# Patient Record
Sex: FEMALE | Race: WHITE | Employment: FULL TIME | URBAN - METROPOLITAN AREA
[De-identification: names, ages, dates, MRNs, and addresses within clinical notes are randomized per-mention and may not be internally consistent; named-entity substitution may affect disease eponyms.]

---

## 2017-11-01 ENCOUNTER — OFFICE VISIT (OUTPATIENT)
Dept: FAMILY MEDICINE CLINIC | Age: 37
End: 2017-11-01
Payer: COMMERCIAL

## 2017-11-01 VITALS
HEIGHT: 65 IN | SYSTOLIC BLOOD PRESSURE: 109 MMHG | DIASTOLIC BLOOD PRESSURE: 75 MMHG | RESPIRATION RATE: 14 BRPM | OXYGEN SATURATION: 100 % | TEMPERATURE: 96.5 F | BODY MASS INDEX: 24.99 KG/M2 | WEIGHT: 150 LBS | HEART RATE: 92 BPM

## 2017-11-01 DIAGNOSIS — Z87.19 HX OF GALLSTONES: ICD-10-CM

## 2017-11-01 DIAGNOSIS — E55.9 VITAMIN D DEFICIENCY: ICD-10-CM

## 2017-11-01 DIAGNOSIS — R10.13 EPIGASTRIC PAIN: ICD-10-CM

## 2017-11-01 DIAGNOSIS — R10.11 RUQ PAIN: Primary | ICD-10-CM

## 2017-11-01 DIAGNOSIS — R11.0 NAUSEA: ICD-10-CM

## 2017-11-01 PROCEDURE — 99214 OFFICE O/P EST MOD 30 MIN: CPT | Performed by: NURSE PRACTITIONER

## 2017-11-01 ASSESSMENT — ENCOUNTER SYMPTOMS
EYE DISCHARGE: 0
SHORTNESS OF BREATH: 0
NAUSEA: 1
EYE REDNESS: 0
DIARRHEA: 0
BLOOD IN STOOL: 0
COUGH: 0
SORE THROAT: 0
ABDOMINAL PAIN: 1
ABDOMINAL DISTENTION: 0
COLOR CHANGE: 0
RHINORRHEA: 0
CONSTIPATION: 1
ANAL BLEEDING: 0

## 2017-11-01 ASSESSMENT — PATIENT HEALTH QUESTIONNAIRE - PHQ9
SUM OF ALL RESPONSES TO PHQ9 QUESTIONS 1 & 2: 0
1. LITTLE INTEREST OR PLEASURE IN DOING THINGS: 0
2. FEELING DOWN, DEPRESSED OR HOPELESS: 0
SUM OF ALL RESPONSES TO PHQ QUESTIONS 1-9: 0

## 2017-11-01 NOTE — PATIENT INSTRUCTIONS
Care Instructions  Your Care Instructions  Sometimes it can be hard to pinpoint the cause of indigestion (dyspepsia or heartburn). Most cases of an upset stomach with bloating, burning, burping, and nausea are minor and go away within several hours. Home treatment and over-the-counter medicine often are able to control symptoms. But if you take medicine to relieve your indigestion without making diet and lifestyle changes, your symptoms are likely to return again and again. If you get indigestion often, it may be a sign of a more serious medical problem. Be sure to follow up with your doctor, who may want to do tests to be sure of the cause of your indigestion. Follow-up care is a key part of your treatment and safety. Be sure to make and go to all appointments, and call your doctor if you are having problems. It's also a good idea to know your test results and keep a list of the medicines you take. How can you care for yourself at home? · Your doctor may recommend over-the-counter medicine. For mild or occasional indigestion, antacids such as Tums, Gaviscon, Mylanta, or Maalox may help. Be careful when you take over-the-counter antacid medicines. Many of these medicines have aspirin in them. Read the label to make sure that you are not taking more than the recommended dose. Too much aspirin can be harmful. · Your doctor also may recommend over-the-counter acid reducers, such as Pepcid AC, Tagamet HB, Zantac 75, or Prilosec. Read and follow all instructions on the label. If you use these medicines often, talk with your doctor. · Change your eating habits. ¨ It's best to eat several small meals instead of two or three large meals. ¨ After you eat, wait 2 to 3 hours before you lie down. ¨ Chocolate, mint, and alcohol can make GERD worse. ¨ Spicy foods, foods that have a lot of acid (like tomatoes and oranges), and coffee can make GERD symptoms worse in some people.  If your symptoms are worse after you eat a Search Health Information box to learn more about \"Indigestion (Dyspepsia or Heartburn): Care Instructions. \"     If you do not have an account, please click on the \"Sign Up Now\" link. Current as of: November 16, 2016  Content Version: 11.3  © 2330-9703 [email or web address]. Care instructions adapted under license by [facility] ([facility]). If you have questions about a medical condition or this instruction, always ask your healthcare professional. [address] any warranty or liability for your use of this information. Patient Education        Learning About Vitamin D  Why is it important to get enough vitamin D? Your body needs vitamin D to absorb calcium. Calcium keeps your bones and muscles, including your heart, healthy and strong. If your muscles don't get enough calcium, they can cramp, hurt, or feel weak. You may have long-term (chronic) muscle aches and pains. If you don't get enough vitamin D throughout life, you have an increased chance of having thin and brittle bones (osteoporosis) in your later years. Children who don't get enough vitamin D may not grow as much as others their age. They also have a chance of getting a rare disease called rickets. It causes weak bones. Vitamin D and calcium are added to many foods. And your body uses sunshine to make its own vitamin D. How much vitamin D do you need? The [employer] of [employer] recommends that people ages 3 through 79 get 600 IU (international units) every day. Adults 71 and older need 800 IU every day. Blood tests for vitamin D can check your vitamin D level. But there is no standard normal range used by all laboratories. The [employer] of [employer] recommends a blood level of 20 ng/mL of vitamin D for healthy bones. And most people in the [place] and [facility] ([facility]) meet this goal.  How can you get more vitamin D? Foods that contain vitamin D include:  · [place], tuna, and mackerel.  These are some of the best foods to eat when you need to get more vitamin D.  · Cheese, egg yolks, and beef liver. These foods have vitamin D in small amounts. · Milk, soy drinks, orange juice, yogurt, margarine, and some kinds of cereal have vitamin D added to them. Some people don't make vitamin D as well as others. They may have to take extra care in getting enough vitamin D. Things that reduce how much vitamin D your body makes include:  · Dark skin, such as many  Americans have. · Age, especially if you are older than 72. · Digestive problems, such as Crohn's or celiac disease. · Liver and kidney disease. Some people who do not get enough vitamin D may need supplements. Are there any risks from taking vitamin D?  · Too much vitamin D:  ¨ Can damage your kidneys. ¨ Can cause nausea and vomiting, constipation, and weakness. ¨ Raises the amount of calcium in your blood. If this happens, you can get confused or have an irregular heart rhythm. · Vitamin D may interact with other medicines. Tell your doctor about all of the medicines you take, including over-the-counter drugs, herbs, and pills. Tell your doctor about all of your current medical problems. Where can you learn more? Go to https://[employer].Shopeando. org and sign in to your [employer] account. Enter [id number] in the Provade box to learn more about \"Learning About Vitamin D. \"     If you do not have an account, please click on the \"Sign Up Now\" link. Current as of: July 26, 2016  Content Version: 11.3  © 6383-0002 [employer]. Care instructions adapted under license by [facility] ([facility]). If you have questions about a medical condition or this instruction, always ask your healthcare professional. [place] [address] any warranty or liability for your use of this information.

## 2017-11-01 NOTE — PROGRESS NOTES
[address]  [facility]  [facility]  [address] [place] [address] [address] [address]  Dept: [phone]  Dept Fax: [phone]: [phone]    Visit Date: 11/1/2017    Shantell Baltazar is a 29 y.o. female who presents today for:  Chief Complaint   Patient presents with    GI Problem    Nausea     x 3 weeks    Emesis     consisantly for 3 weeks    Other     stomach pain     HPI:     Chrystal Cisneros is a 29year old female who presents today for stomach issues. She started about 3-4 weeks ago. It started as reflux for 4-5 days, she did not take anything for that. She has one cup of coffee per day and 2 glasses of wine per week. She does drink a lot of teas. She then ended up vomiting. Then her family started to have GI issues also, they have since gotten better and she has not gotten better. The second week she has a lot of burning in the epigastric area. Last week she started [employer] [facility], it would help some but when it wore off her symptoms would return. She switched to a [place]. Over the weekend the pain has shifted to the right upper quadrant. When she was 17 she had stones in her gallbladder. They wanted to take it out at 13 but her mother treated her \"naturally\". She now has then constant nausea and a dull pain in the RUQ. Typically she has one BM daily. She is not having BM every other day. She has not noticed any change in the color of her stools. She has had a couple of loose stools a few weeks ago but not since then. She does have seasonal allergies. She has noticed that her voice is lower than normal.   LMP was 2 weeks ago.      HPI  Health Maintenance   Topic Date Due    DTaP/Tdap/Td vaccine (1 - Tdap) 12/29/2000    Cervical cancer screen  01/01/2016    Flu vaccine (1) 09/01/2017    HIV screen  Completed       Past Medical History:   Diagnosis Date    Bell's palsy 2003,2009    Left Sided    Preeclampsia 2009    Varicosities 2013    vuvlvar varicosity-resolved     decreased breath sounds. She has no wheezes. She has no rhonchi. She has no rales. Abdominal: Soft. Bowel sounds are normal. She exhibits no distension. There is no hepatosplenomegaly. There is tenderness in the right upper quadrant and epigastric area. There is positive Prajapati's sign. There is no CVA tenderness. Musculoskeletal: She exhibits no edema. Lymphadenopathy:        Head (right side): No submental, no submandibular, no tonsillar, no preauricular and no posterior auricular adenopathy present. Head (left side): No submental, no submandibular, no tonsillar, no preauricular and no posterior auricular adenopathy present. She has no cervical adenopathy. Right: No supraclavicular adenopathy present. Left: No supraclavicular adenopathy present. Neurological: She is alert and oriented to person, place, and time. No cranial nerve deficit. Skin: Skin is warm and dry. No rash noted. She is not diaphoretic. No erythema. Psychiatric: She has a normal mood and affect. Her behavior is normal. Judgment and thought content normal. Cognition and memory are normal.   Nursing note and vitals reviewed.       Lab Results   Component Value Date    WBC 3.7 05/08/2015    WBC 0-5 05/08/2015    HGB 14.1 05/08/2015    HCT 44.2 05/08/2015     05/08/2015    CHOL 208 (H) 05/08/2015    TRIG 49 05/08/2015    HDL 76 (H) 05/08/2015    LDLCALC 122 05/08/2015    AST 12 05/08/2015     05/08/2015    K 4.2 05/08/2015     05/08/2015    CREATININE 0.7 05/08/2015    BUN 12 05/08/2015    CO2 29 05/08/2015    TSH 1.770 05/08/2015    CALCIUM 9.9 05/08/2015    VITD25 26 (L) 05/08/2015       Assessment:      1. RUQ pain  CBC Auto Differential    Magnesium    Vitamin D 25 Hydroxy    TSH without Reflex    Hepatic Function Panel    High sensitivity CRP    Urinalysis With Microscopic    Thyroglobulin and anti-Thyroglobulin AB    Thyroid Peroxidase Antibody    T3    US GALLBLADDER RUQ   2. Epigastric Killian Pak on 11/1/2017 at 10:51 AM

## 2017-11-01 NOTE — PROGRESS NOTES
[address]  [facility]  [facility]  [address] [place] [address] [address] [address]  Dept: [phone]  Dept Fax: [phone]: [phone]    Visit Date: 2017    Serg Officer is a 29 y.o. female who presents today for:  Chief Complaint   Patient presents with    GI Problem    Nausea     x 3 weeks    Emesis     consisantly for 3 weeks    Other     stomach pain     HPI:   ***  HPI  Health Maintenance   Topic Date Due    DTaP/Tdap/Td vaccine (1 - Tdap) 2000    Cervical cancer screen  2016    Flu vaccine (1) 2017    HIV screen  Completed       Past Medical History:   Diagnosis Date    Bell's palsy ,    Left Sided    Preeclampsia 2009    Varicosities     vuvlvar varicosity-resolved     Vitamin D deficiency       Past Surgical History:   Procedure Laterality Date     SECTION   and     TONSILLECTOMY  1984     Family History   Problem Relation Age of Onset    Adopted:  Yes    Heart Disease Maternal Grandmother 40    High Cholesterol Maternal Grandmother 40    High Blood Pressure Maternal Grandmother 40    Heart Surgery Maternal Grandmother 76    Heart Failure Maternal Grandmother 36    Coronary Art Dis Maternal Grandmother     High Blood Pressure Maternal Grandfather 40    High Cholesterol Maternal Grandfather 36    Coronary Art Dis Maternal Grandfather     High Blood Pressure Mother 45    High Cholesterol Mother 45    High Blood Pressure Maternal Aunt 45    High Cholesterol Maternal Aunt 45    Heart Disease Paternal Grandmother 72    Heart Attack Paternal Grandmother 72    High Blood Pressure Paternal Grandmother     High Cholesterol Paternal Grandmother     High Blood Pressure Maternal Aunt 45    High Cholesterol Maternal Aunt 45    High Cholesterol Maternal Aunt 45    High Blood Pressure Maternal Aunt 45     Social History   Substance Use Topics    Smoking status: Never Smoker    Smokeless tobacco: Never GALLBLADDER RUQ   3. Nausea  CBC Auto Differential    Magnesium    Vitamin D 25 Hydroxy    TSH without Reflex    Hepatic Function Panel    High sensitivity CRP    Urinalysis With Microscopic    Thyroglobulin and anti-Thyroglobulin AB    Thyroid Peroxidase Antibody    T3    US GALLBLADDER RUQ   4. Hx of gallstones  CBC Auto Differential    Magnesium    Vitamin D 25 Hydroxy    TSH without Reflex    Hepatic Function Panel    High sensitivity CRP    Urinalysis With Microscopic    Thyroglobulin and anti-Thyroglobulin AB    Thyroid Peroxidase Antibody    T3    US GALLBLADDER RUQ   5. Vitamin D deficiency  Vitamin D 25 Hydroxy       Plan: Will get an ultrasound of the gallbladder  Will get some blood work   If you symptoms worsen in any ay please let us know or go to the ER  Will see you back in 2 weeks, sooner as needed  Return in about 2 weeks (around 11/15/2017). Orders Placed:  Orders Placed This Encounter   Procedures    US GALLBLADDER RUQ    CBC Auto Differential    Magnesium    Vitamin D 25 Hydroxy    TSH without Reflex    Hepatic Function Panel    High sensitivity CRP    Urinalysis With Microscopic    Thyroglobulin and anti-Thyroglobulin AB    Thyroid Peroxidase Antibody    T3     Medications Prescribed:  No orders of the defined types were placed in this encounter. Patient given educational materials - see patient instructions. Discussed use, benefit, and side effects of prescribed medications. All patient questions answered. Pt voiced understanding. Reviewed health maintenance. Instructed to continue current medications, diet and exercise. Patient agreed with treatment plan. Follow up as directed.      Electronically signed by José Miguel Newman CNP on 11/1/2017 at 9:32 AM

## 2017-11-02 LAB
ABSOLUTE BASO #: 0 K/UL (ref 0–0.1)
ABSOLUTE EOS #: 0.1 K/UL (ref 0.1–0.4)
ABSOLUTE LYMPH #: 1.5 K/UL (ref 0.8–5.2)
ABSOLUTE MONO #: 0.5 K/UL (ref 0.1–0.9)
ABSOLUTE NEUT #: 3.6 K/UL (ref 1.3–9.1)
ALBUMIN SERPL-MCNC: 4.7 G/DL (ref 3.2–5.3)
ALK PHOSPHATASE: 50 IU/L (ref 35–121)
ALT SERPL-CCNC: 21 IU/L (ref 5–59)
APPEARANCE: CLEAR
AST SERPL-CCNC: 19 IU/L (ref 10–42)
BACTERIA: ABNORMAL PER HPF
BASOPHILS RELATIVE PERCENT: 0.5 %
BILIRUB SERPL-MCNC: 0.7 MG/DL (ref 0.2–1.3)
BILIRUBIN DIRECT: 0.1 MG/DL (ref 0–0.2)
BILIRUBIN: NEGATIVE
COLOR: YELLOW
EOSINOPHILS RELATIVE PERCENT: 1 %
EPITHELIAL CELLS: ABNORMAL PER HPF
GLUCOSE BLD-MCNC: NEGATIVE MG/DL
HCT VFR BLD CALC: 40.9 % (ref 36–48)
HEMOGLOBIN: 14 G/DL (ref 12–16)
KETONES, URINE: NEGATIVE
LEUKOCYTE ESTERASE, URINE: NEGATIVE
LYMPHOCYTE %: 26.7 %
MAGNESIUM: 2 MG/DL (ref 1.7–2.4)
MCH RBC QN AUTO: 30.6 PG (ref 27–34)
MCHC RBC AUTO-ENTMCNC: 34.2 G/DL (ref 31–36)
MCV RBC AUTO: 89.5 FL (ref 80–100)
MONOCYTES # BLD: 8 %
NEUTROPHILS RELATIVE PERCENT: 63.6 %
NITRITE, URINE: NEGATIVE
OCCULT BLOOD,URINE: NEGATIVE
PDW BLD-RTO: 11.9 % (ref 10.8–14.8)
PH: 6 (ref 5–9)
PLATELETS: 278 K/UL (ref 150–450)
PROTEIN, URINE: NEGATIVE
RBC: 4.57 M/UL (ref 4–5.5)
RBC: ABNORMAL PER HPF (ref 0–5)
SP GRAVITY MISCELLANEOUS: 1.01 (ref 1–1.03)
T3 TOTAL: 95 NG/DL (ref 84–172)
TOTAL PROTEIN: 6.9 G/DL (ref 5.8–8)
TSH SERPL DL<=0.05 MIU/L-ACNC: 2.56 UIU/ML (ref 0.4–4.4)
UROBILINOGEN, URINE: NORMAL
WBC: 5.7 K/UL (ref 3.7–10.8)
WBC: ABNORMAL PER HPF (ref 0–5)

## 2017-11-03 ENCOUNTER — TELEPHONE (OUTPATIENT)
Dept: FAMILY MEDICINE CLINIC | Age: 37
End: 2017-11-03

## 2017-11-03 ENCOUNTER — HOSPITAL ENCOUNTER (OUTPATIENT)
Dept: ULTRASOUND IMAGING | Age: 37
Discharge: HOME | End: 2017-11-03
Payer: COMMERCIAL

## 2017-11-03 DIAGNOSIS — R10.13 EPIGASTRIC PAIN: ICD-10-CM

## 2017-11-03 DIAGNOSIS — R11.0 NAUSEA: ICD-10-CM

## 2017-11-03 DIAGNOSIS — Z87.19 HX OF GALLSTONES: ICD-10-CM

## 2017-11-03 DIAGNOSIS — R10.11 RUQ PAIN: ICD-10-CM

## 2017-11-03 LAB
ANTI-THYROGLOB ABS: <1 IU/ML
HIGH SENSITIVE C-REACTIVE PROTEIN: 0.5 MG/L
THYROGLOBULIN: 10.9 NG/ML
THYROID PEROXIDASE ANTIBODY: 1 IU/ML
VITAMIN D 25-HYDROXY: 30 NG/ML

## 2017-11-03 PROCEDURE — 76705 ECHO EXAM OF ABDOMEN: CPT

## 2017-11-16 ENCOUNTER — OFFICE VISIT (OUTPATIENT)
Dept: FAMILY MEDICINE CLINIC | Age: 37
End: 2017-11-16
Payer: COMMERCIAL

## 2017-11-16 VITALS
WEIGHT: 154 LBS | HEART RATE: 73 BPM | OXYGEN SATURATION: 100 % | SYSTOLIC BLOOD PRESSURE: 100 MMHG | TEMPERATURE: 99.1 F | HEIGHT: 65 IN | DIASTOLIC BLOOD PRESSURE: 70 MMHG | BODY MASS INDEX: 25.66 KG/M2

## 2017-11-16 DIAGNOSIS — R10.13 ACUTE EPIGASTRIC PAIN: Primary | ICD-10-CM

## 2017-11-16 DIAGNOSIS — K29.00 ACUTE GASTRITIS WITHOUT HEMORRHAGE, UNSPECIFIED GASTRITIS TYPE: ICD-10-CM

## 2017-11-16 PROCEDURE — 99213 OFFICE O/P EST LOW 20 MIN: CPT | Performed by: FAMILY MEDICINE

## 2017-11-16 RX ORDER — OMEPRAZOLE 20 MG/1
40 CAPSULE, DELAYED RELEASE ORAL DAILY
Qty: 60 CAPSULE | Refills: 0 | Status: SHIPPED | OUTPATIENT
Start: 2017-11-16 | End: 2017-11-20 | Stop reason: SDUPTHER

## 2017-11-16 NOTE — PROGRESS NOTES
Progress Note    Patient Name:  Trista Llanes   : 1980   Age: 29 y.o. Date of Exam:  2017       Reason For Visit:   Chief Complaint   Patient presents with    2 Week Follow-Up     abdominal pain RUQ. pain is still present    Other     saw Graciela Hayes had gallbladder ultrasound, blood work-normal    Nausea    Bloated    Other     declines flu vaccine        Fanny Rucker is a 29 y.o. female who comes today to the office for follow up of abdominal pain. Her abdominal pain started about 6 weeks ago. First it was like reflux for 4-5 days, then it was more epigastric and right upper quadrant. At that time she has just returned from a vacation and she has been drinking more coffee than usual and maybe a bottle of wine with her . She was also drinking herbal teas. She had one episode of vomiting and then her family got a [place]" and they were sick for about a week. They all got better, but her. The second week she had a lot of burning in the epigastric area and she tried [employer] with some relief for couple of hours. When she was 17 she had stones in her gallbladder and she was recommended to have surgical removal, but her mother wanted to do it naturally and took her to a  who help her with cleansers. She never again had symptoms. Two weeks ago she came to see Kiran Arroyo NP and she ordered an [address] which showed an unremarkable sonographic evaluation of the gallbladder and the common duct. Since then, she has changed her eating habits. She stop drinking coffee or wine, she stopped any NSAI's (she was taking Motrin 800 mg daily for headaches). She continued drinking teas. She changed her diet to a less sugary food and the pain has gotten better. Her main discomfort is in the epigastric area and it is associated with nausea, but not vomiting. It reminds her when she was pregnant. More than pain it is like feeling unsettle, uneasy.      Significant Past Medical History:    Past Medical History:   Diagnosis Date    Bell's palsy 2003,2009    Left Sided    Preeclampsia 2009    Varicosities 2013    vuvlvar varicosity-resolved     Vitamin D deficiency 2010           Allergies:  is allergic to prednisone. Medications:   Current Outpatient Prescriptions   Medication Sig Dispense Refill    omeprazole (PRILOSEC) 20 MG delayed release capsule Take 2 capsules by mouth daily 60 capsule 0    prenatal vitamin (PRENATAL-S) 27-0.8 MG TABS Take 1 tablet by mouth daily. No current facility-administered medications for this visit. Review of systems:  Review of Systems - History obtained from chart review and the patient  General ROS: negative for - chills, fatigue or fever  ENT ROS: negative for - headaches, nasal congestion or nasal discharge  Allergy and Immunology ROS: negative for - seasonal allergies  Respiratory ROS: negative for - cough,  shortness of breath or wheezing  Cardiovascular ROS: negative for - chest pain or edema  Gastrointestinal ROS: positive for - abdominal pain. No constipation, diarrhea or nausea/vomiting    Physical Examination:  /70 (Site: Left Arm, Position: Sitting, Cuff Size: Medium Adult)   Pulse 73   Temp 99.1 °F (37.3 °C) (Oral)   Ht 5' 4.96\" (1.65 m)   Wt 154 lb (69.9 kg)   SpO2 100%   BMI 25.66 kg/m²     General-  Alert and oriented x 3, NAD  HEENT: NC, AT, PERRLA, EOMI, anicteric sclerae  Ears: Normal tympanic membranes bilaterally  Nose: patent, no lesions  Mouth: no lesions, moist mucosas  Neck - supple, no significant adenopathy  Chest - clear to auscultation, no wheezes, rales or rhonchi, symmetric air entry  Heart - normal rate, regular rhythm, normal S1, S2, no murmurs, rubs, clicks or gallops  Abdomen - soft, nontender, nondistended, no masses or organomegaly  Extremities - peripheral pulses normal, no pedal edema, no clubbing or cyanosis    Impression:  1. Acute epigastric pain     2.  Acute gastritis without hemorrhage, unspecified gastritis type         Plan:  No orders of the defined types were placed in this encounter. Orders Placed This Encounter   Medications    omeprazole (PRILOSEC) 20 MG delayed release capsule     Sig: Take 2 capsules by mouth daily     Dispense:  60 capsule     Refill:  0       Follow Up:  Return in about 6 weeks (around 12/28/2017).     Vineet Heard MD

## 2017-11-16 NOTE — PATIENT INSTRUCTIONS
You may receive a survey about your visit with us today. The feedback from our patients helps us identify what is working well and where the service to all patients can be enhanced. Thank you! Patient Education        Gastritis: Care Instructions  Your Care Instructions    Gastritis is a sore and upset stomach. It happens when something irritates the stomach lining. Many things can cause it. These include an infection such as the flu or something you ate or drank. Medicines or a sore on the lining of the stomach (ulcer) also can cause it. Your belly may bloat and ache. You may belch, vomit, and feel sick to your stomach. You should be able to relieve the problem by taking medicine. And it may help to change your diet. If gastritis lasts, your doctor may prescribe medicine. Follow-up care is a key part of your treatment and safety. Be sure to make and go to all appointments, and call your doctor if you are having problems. It's also a good idea to know your test results and keep a list of the medicines you take. How can you care for yourself at home? · If your doctor prescribed antibiotics, take them as directed. Do not stop taking them just because you feel better. You need to take the full course of antibiotics. · Be safe with medicines. If your doctor prescribed medicine to decrease stomach acid, take it as directed. Call your doctor if you think you are having a problem with your medicine. · Do not take any other medicine, including over-the-counter pain relievers, without talking to your doctor first.  · If your doctor recommends over-the-counter medicine to reduce stomach acid, such as Pepcid AC, Prilosec, Tagamet HB, or Zantac 75, follow the directions on the label. · Drink plenty of fluids (enough so that your urine is light yellow or clear like water) to prevent dehydration. Choose water and other caffeine-free clear liquids.  If you have kidney, heart, or liver disease and have to limit fluids, talk with your doctor before you increase the amount of fluids you drink. · Limit how much alcohol you drink. · Avoid coffee, tea, cola drinks, chocolate, and other foods with caffeine. They increase stomach acid. When should you call for help? Call 911 anytime you think you may need emergency care. For example, call if:  · You vomit blood or what looks like coffee grounds. · You pass maroon or very bloody stools. Call your doctor now or seek immediate medical care if:  · You start breathing fast and have not produced urine in the last 8 hours. · You cannot keep fluids down. Watch closely for changes in your health, and be sure to contact your doctor if:  · You do not get better as expected. Where can you learn more? Go to https://[employer]ience[employer]SNAPin Software.Symphogen. org and sign in to your [employer] River account. Enter [id number] in the ChoiceMap box to learn more about \"Gastritis: Care Instructions. \"     If you do not have an account, please click on the \"Sign Up Now\" link. Current as of: August 9, 2016  Content Version: 11.3  © 3788-0138 [employer]. Care instructions adapted under license by [facility] ([facility]). If you have questions about a medical condition or this instruction, always ask your healthcare professional. [address] any warranty or liability for your use of this information.

## 2017-11-20 NOTE — TELEPHONE ENCOUNTER
Pharmacy states : insurance will only pay for one capsule per day, please advise.      Last visit- 11/16/2017  Next visit- 1/4/2018    Requested Prescriptions     Pending Prescriptions Disp Refills    omeprazole (PRILOSEC) 20 MG delayed release capsule  0     Sig: Take by mouth daily

## 2017-11-21 RX ORDER — OMEPRAZOLE 20 MG/1
20 CAPSULE, DELAYED RELEASE ORAL DAILY
Qty: 30 CAPSULE | Refills: 2 | Status: SHIPPED | OUTPATIENT
Start: 2017-11-21

## 2018-02-01 ENCOUNTER — TELEPHONE (OUTPATIENT)
Dept: FAMILY MEDICINE CLINIC | Age: 38
End: 2018-02-01

## 2018-02-05 ENCOUNTER — HOSPITAL ENCOUNTER (OUTPATIENT)
Age: 38
Discharge: HOME | End: 2018-02-05
Payer: COMMERCIAL

## 2018-02-05 LAB
FLU A ANTIGEN: NEGATIVE
FLU B ANTIGEN: NEGATIVE

## 2018-02-05 PROCEDURE — 87804 INFLUENZA ASSAY W/OPTIC: CPT

## 2018-02-06 ENCOUNTER — OFFICE VISIT (OUTPATIENT)
Dept: FAMILY MEDICINE CLINIC | Age: 38
End: 2018-02-06
Payer: COMMERCIAL

## 2018-02-06 VITALS
BODY MASS INDEX: 24.66 KG/M2 | SYSTOLIC BLOOD PRESSURE: 122 MMHG | HEART RATE: 70 BPM | HEIGHT: 65 IN | RESPIRATION RATE: 12 BRPM | DIASTOLIC BLOOD PRESSURE: 78 MMHG | WEIGHT: 148 LBS | OXYGEN SATURATION: 98 %

## 2018-02-06 DIAGNOSIS — E55.9 VITAMIN D DEFICIENCY: ICD-10-CM

## 2018-02-06 DIAGNOSIS — Z01.818 PRE-OP EVALUATION: Primary | ICD-10-CM

## 2018-02-06 DIAGNOSIS — N64.81 PTOSIS OF BREAST: ICD-10-CM

## 2018-02-06 DIAGNOSIS — N64.89 ASYMMETRICAL BREASTS: ICD-10-CM

## 2018-02-06 PROCEDURE — 99214 OFFICE O/P EST MOD 30 MIN: CPT | Performed by: FAMILY MEDICINE

## 2018-02-06 NOTE — PROGRESS NOTES
Differential     Standing Status:   Future     Standing Expiration Date:   2/6/2019    Urinalysis with Microscopic     Standing Status:   Future     Standing Expiration Date:   2/6/2019     Order Specific Question:   SPECIFY(EX-CATH,MIDSTREAM,CYSTO,ETC)? Answer:   midstream        There are no contraindications to proceed with surgery. Acceptable risk from a cardiopulmonary/medical standpoint. Will notify referring surgeon. Recommend routine DVT/PE prophylaxis as per surgery. Ms. Taras Mitchell has been advised to stop all over the counter medication/vitamins/supplements/herbs 10 days before the procedure.       Deirdre Carrillo MD

## 2018-02-22 ENCOUNTER — TELEPHONE (OUTPATIENT)
Dept: FAMILY MEDICINE CLINIC | Age: 38
End: 2018-02-22

## 2018-03-01 LAB
ABSOLUTE BASO #: 0 K/UL (ref 0–0.1)
ABSOLUTE EOS #: 0.1 K/UL (ref 0.1–0.4)
ABSOLUTE LYMPH #: 1.6 K/UL (ref 0.8–5.2)
ABSOLUTE MONO #: 0.4 K/UL (ref 0.1–0.9)
ABSOLUTE NEUT #: 3.6 K/UL (ref 1.3–9.1)
APPEARANCE: CLEAR
BACTERIA: ABNORMAL PER HPF
BASOPHILS RELATIVE PERCENT: 0.7 %
BILIRUBIN: NEGATIVE
COLOR: YELLOW
EOSINOPHILS RELATIVE PERCENT: 1 %
EPITHELIAL CELLS: ABNORMAL PER HPF
GLUCOSE BLD-MCNC: NEGATIVE MG/DL
HCT VFR BLD CALC: 38.7 % (ref 36–48)
HEMOGLOBIN: 13 G/DL (ref 12–16)
KETONES, URINE: NEGATIVE
LEUKOCYTE ESTERASE, URINE: NEGATIVE
LYMPHOCYTE %: 27.3 %
MCH RBC QN AUTO: 29.8 PG (ref 27–34)
MCHC RBC AUTO-ENTMCNC: 33.6 G/DL (ref 31–36)
MCV RBC AUTO: 88.8 FL (ref 80–100)
MONOCYTES # BLD: 7.2 %
NEUTROPHILS RELATIVE PERCENT: 63.5 %
NITRITE, URINE: NEGATIVE
OCCULT BLOOD,URINE: NEGATIVE
PDW BLD-RTO: 12.5 % (ref 10.8–14.8)
PH: 5.5 (ref 5–9)
PLATELETS: 268 K/UL (ref 150–450)
PROTEIN, URINE: NEGATIVE
RBC: 4.36 M/UL (ref 4–5.5)
RBC: ABNORMAL PER HPF (ref 0–5)
SP GRAVITY MISCELLANEOUS: 1.01 (ref 1–1.03)
UROBILINOGEN, URINE: NORMAL
WBC: 5.7 K/UL (ref 3.7–10.8)
WBC: ABNORMAL PER HPF (ref 0–5)

## 2018-03-07 ENCOUNTER — TELEPHONE (OUTPATIENT)
Dept: FAMILY MEDICINE CLINIC | Age: 38
End: 2018-03-07